# Patient Record
Sex: MALE | Race: WHITE | Employment: FULL TIME | URBAN - METROPOLITAN AREA
[De-identification: names, ages, dates, MRNs, and addresses within clinical notes are randomized per-mention and may not be internally consistent; named-entity substitution may affect disease eponyms.]

---

## 2019-01-04 ENCOUNTER — TRANSFERRED RECORDS (OUTPATIENT)
Dept: HEALTH INFORMATION MANAGEMENT | Facility: CLINIC | Age: 44
End: 2019-01-04

## 2019-01-11 ENCOUNTER — TRANSFERRED RECORDS (OUTPATIENT)
Dept: HEALTH INFORMATION MANAGEMENT | Facility: CLINIC | Age: 44
End: 2019-01-11

## 2019-01-23 ENCOUNTER — TRANSFERRED RECORDS (OUTPATIENT)
Dept: HEALTH INFORMATION MANAGEMENT | Facility: CLINIC | Age: 44
End: 2019-01-23

## 2019-02-08 ENCOUNTER — TRANSFERRED RECORDS (OUTPATIENT)
Dept: HEALTH INFORMATION MANAGEMENT | Facility: CLINIC | Age: 44
End: 2019-02-08

## 2019-02-14 ENCOUNTER — TRANSFERRED RECORDS (OUTPATIENT)
Dept: HEALTH INFORMATION MANAGEMENT | Facility: CLINIC | Age: 44
End: 2019-02-14

## 2019-02-14 LAB — POTASSIUM SERPL-SCNC: 3.9 MMOL/L (ref 3.5–5)

## 2019-02-19 ENCOUNTER — TELEPHONE (OUTPATIENT)
Dept: NEUROSURGERY | Facility: CLINIC | Age: 44
End: 2019-02-19

## 2019-02-19 ENCOUNTER — OFFICE VISIT (OUTPATIENT)
Dept: NEUROSURGERY | Facility: CLINIC | Age: 44
End: 2019-02-19
Attending: NURSE PRACTITIONER
Payer: COMMERCIAL

## 2019-02-19 VITALS
WEIGHT: 205 LBS | SYSTOLIC BLOOD PRESSURE: 127 MMHG | OXYGEN SATURATION: 96 % | DIASTOLIC BLOOD PRESSURE: 89 MMHG | HEART RATE: 119 BPM | HEIGHT: 71 IN | BODY MASS INDEX: 28.7 KG/M2

## 2019-02-19 DIAGNOSIS — M54.9 INTRACTABLE BACK PAIN: ICD-10-CM

## 2019-02-19 DIAGNOSIS — M54.42 ACUTE BILATERAL LOW BACK PAIN WITH LEFT-SIDED SCIATICA: Primary | ICD-10-CM

## 2019-02-19 DIAGNOSIS — R29.898 LEFT LEG WEAKNESS: ICD-10-CM

## 2019-02-19 PROCEDURE — 99203 OFFICE O/P NEW LOW 30 MIN: CPT | Performed by: NEUROLOGICAL SURGERY

## 2019-02-19 PROCEDURE — G0463 HOSPITAL OUTPT CLINIC VISIT: HCPCS

## 2019-02-19 RX ORDER — LOSARTAN POTASSIUM 25 MG/1
50 TABLET ORAL DAILY
COMMUNITY

## 2019-02-19 ASSESSMENT — MIFFLIN-ST. JEOR: SCORE: 1842

## 2019-02-19 ASSESSMENT — PAIN SCALES - GENERAL: PAINLEVEL: SEVERE PAIN (6)

## 2019-02-19 NOTE — PATIENT INSTRUCTIONS
Patient Instructions  Pre-Operative:   -Surgery scheduled at [facility] for Left L5-S1 Microdiscectomy  -Surgical risks: blood clots in the leg or lung, problems urinating, nerve damage, drainage from the incision, infection,stiffness  - Pre-operative physical with primary care physician within 30 days of surgical date.   - Likely same day procedure with discharge home day of surgery, may stay for 23 hour observation hospitalization for monitoring.     -Shower procedure: please shower with antimicrobial soap the night before surgery and morning of surgery. Please refer to showering instruction sheet in folder.  -Stop all solid foods 8 hours before surgery.  -Keep drinking clear liquids until 4 hours before surgery. Clear liquids include water, clear juice, black coffee, or clear tea without milk, Gatorade, clear soda.   - Discontinue Aspirin, NSAIDs (Advil/Ibuprofen, Naproxen,Nuprin,Relafen/Nabumetone, Diclofenac,Meloxicam, Aleve, Celebrex) x 7 days prior to surgical date.    - May try tylenol for pain 1000 mg three times per day for pain      Post-Operative:  -you may resume taking NSAIDs 7 days post op   - Post operative incisional pain x 1-2 weeks which will require pain medications and muscle relaxants. You will receive medication upon discharge.  -Do NOT drive while taking narcotic pain medication.  -Do not drink alcohol while using any pain medication  -Post operative incision care- Watch for signs of infection: redness, swelling, warmth, drainage, and fever of 101 degrees or higher. Notify clinic [phone].  -No submerging incision in water such as pools, hot tubs,baths for at least 8 weeks or until incision is healed. Showers are fine.   - Post operative activity limitations: 6-8 weeks, no lifting > 10 pounds, no bending, twisting, or overhead reaching.  -Ok to walk as tolerated, avoid bed rest and prolonged sitting.  -No contact sports until after follow up visit  -No high impact activities  such as; running/jogging, snowmobile or 4 de la o riding or any other recreational vehicles.  - Post op follow up appointments:6 week post op follow up visit with Nurse practitioner.Please call to schedule follow up appointment at [phone].  -If you are currently employed, you will need to be off work for 2-4 weeks for post op recovery and healing.

## 2019-02-19 NOTE — LETTER
2/19/2019         RE: Ernesto Spivey  [address]  [facility] [address]        Dear Colleague,    Thank you for referring your patient, Enresto Spivey, to the [facility]. Please see a copy of my visit note below.    Neurosurgery Spine Consult [facility]      CC: Intractable left lower extremity pain    Primary care Provider: Deneen Smith    I was aked to see the patient by;  Referred Self, MD  No address on file      [place]: Ernesto Spivey is a 44 year old male that presents to clinic with a complaint of intractable left lower extremity pain and weakness.  The patient describes having a fall on December 25 and subsequent pain developed on December 30.  The pain is in the left lower extremity in the S1 distribution and was worse with conservative measures including therapy and injections.  The patient has very limited ability to stand and walk and actually uses a walker and has to sit in a kyphotic position due to pain.  Patient states he is unable to function without the walker and his activities of daily living has been significantly limited.      Past Medical History:   Diagnosis Date     Hypertension      MS (multiple sclerosis) (H)        No past surgical history on file.    Current Outpatient Medications   Medication     Cholecalciferol (VITAMIN D3 PO)     glatiramer (COPAXONE) 20 MG/ML injection     HYDROCHLOROTHIAZIDE     losartan (COZAAR) 25 MG tablet     No current facility-administered medications for this visit.        Allergies   Allergen Reactions     Lisinopril Other (See Comments)     cough       Social History     Socioeconomic History     Marital status: Single     Spouse name: None     Number of children: None     Years of education: None     Highest education level: None   Social Needs     Financial resource strain: None     Food insecurity - worry: None     Food insecurity - inability: None      Transportation needs - medical: None     Transportation needs - non-medical: None   Occupational History     None   Tobacco Use     Smoking status: Former Smoker     Smokeless tobacco: Never Used   Substance and Sexual Activity     Alcohol use: Yes     Drug use: No     Sexual activity: None   Other Topics Concern     None   Social History Narrative     None       No family history on file.      Review Of Systems  Skin: negative  Eyes: negative  Ears/Nose/Throat: negative  Respiratory: No shortness of breath, dyspnea on exertion, cough, or hemoptysis  Cardiovascular: negative  Gastrointestinal: negative  Genitourinary: negative  Musculoskeletal: as above and back pain  Neurologic: as above  Psychiatric: negative  Hematologic/Lymphatic/Immunologic: negative  Endocrine: negative    B/P: 127/89, T: Data Unavailable, P: 119, R: Data Unavailable    Examination:  Normal affect and mood  No obvious labored respiration  No skin lesions noted   No obvious pitting edema  No abnormal psychiatric behavior  No obvious musculoskeletal abnormalities   Awake  Alert  Oriented x 3  Speech clear  Cranial nerves II - XII intact  Face symmetric    Limited ROM of back  Motor exam limited by severe pain left lower extremity, however, the right lower extremity has normal motor strength  Sensation decreased in left lower extremity  Clonus negative  DTR 1 +  Positive Lase'theodora's sign on the left  Ambulation is limited with walker and kyphotic posture    Imaging:   MRI lumbar spine shows a left L5-S1 herniated disc fragment compressing the left S1 nerve root      Assessment/Plan:   The patient is suffering from intractable left lower extremity pain and weakness secondary to herniated disc on the left at L5-S1 compressing the S1 nerve root.  Therefore I recommend we proceed with an urgent left L5-S1 microdiscectomy.I discussed with the patient the risk of surgery to include, but, not be limited to; nerve injury, failure of improvement  of symptoms, CSF leak, infection, post op hematoma, the need for recurrent surgery, paralysis, coma and death.         Marlon Rodriguez MD, MS, FAANS  Neurosurgeon  [facility]  [address], [address]  [place] [address] [phone]    Again, thank you for allowing me to participate in the care of your patient.        Sincerely,        Marlon Rodriguez MD

## 2019-02-19 NOTE — PROGRESS NOTES
Neurosurgery Spine Consult [facility]      CC: Intractable left lower extremity pain    Primary care Provider: Deneen Smith was aked to see the patient by;  Referred MD Jin  No address on file      [place]: Ernesto Spivey is a 44 year old male that presents to clinic with a complaint of intractable left lower extremity pain and weakness.  The patient describes having a fall on December 25 and subsequent pain developed on December 30.  The pain is in the left lower extremity in the S1 distribution and was worse with conservative measures including therapy and injections.  The patient has very limited ability to stand and walk and actually uses a walker and has to sit in a kyphotic position due to pain.  Patient states he is unable to function without the walker and his activities of daily living has been significantly limited.      Past Medical History:   Diagnosis Date     Hypertension      MS (multiple sclerosis) (H)        No past surgical history on file.    Current Outpatient Medications   Medication     Cholecalciferol (VITAMIN D3 PO)     glatiramer (COPAXONE) 20 MG/ML injection     HYDROCHLOROTHIAZIDE     losartan (COZAAR) 25 MG tablet     No current facility-administered medications for this visit.        Allergies   Allergen Reactions     Lisinopril Other (See Comments)     cough       Social History     Socioeconomic History     Marital status: Single     Spouse name: None     Number of children: None     Years of education: None     Highest education level: None   Social Needs     Financial resource strain: None     Food insecurity - worry: None     Food insecurity - inability: None     Transportation needs - medical: None     Transportation needs - non-medical: None   Occupational History     None   Tobacco Use     Smoking status: Former Smoker     Smokeless tobacco: Never Used   Substance and Sexual Activity     Alcohol use: Yes     Drug use: No     Sexual  activity: None   Other Topics Concern     None   Social History Narrative     None       No family history on file.      Review Of Systems  Skin: negative  Eyes: negative  Ears/Nose/Throat: negative  Respiratory: No shortness of breath, dyspnea on exertion, cough, or hemoptysis  Cardiovascular: negative  Gastrointestinal: negative  Genitourinary: negative  Musculoskeletal: as above and back pain  Neurologic: as above  Psychiatric: negative  Hematologic/Lymphatic/Immunologic: negative  Endocrine: negative    B/P: 127/89, T: Data Unavailable, P: 119, R: Data Unavailable    Examination:  Normal affect and mood  No obvious labored respiration  No skin lesions noted   No obvious pitting edema  No abnormal psychiatric behavior  No obvious musculoskeletal abnormalities   Awake  Alert  Oriented x 3  Speech clear  Cranial nerves II - XII intact  Face symmetric    Limited ROM of back  Motor exam limited by severe pain left lower extremity, however, the right lower extremity has normal motor strength  Sensation decreased in left lower extremity  Clonus negative  DTR 1 +  Positive Lase'theodora's sign on the left  Ambulation is limited with walker and kyphotic posture    Imaging:   MRI lumbar spine shows a left L5-S1 herniated disc fragment compressing the left S1 nerve root      Assessment/Plan:   The patient is suffering from intractable left lower extremity pain and weakness secondary to herniated disc on the left at L5-S1 compressing the S1 nerve root.  Therefore I recommend we proceed with an urgent left L5-S1 microdiscectomy.I discussed with the patient the risk of surgery to include, but, not be limited to; nerve injury, failure of improvement of symptoms, CSF leak, infection, post op hematoma, the need for recurrent surgery, paralysis, coma and death.         Marlon Rodriguez MD, MS, FAANS  Neurosurgeon  [facility]  [facility]  [id number] [place] [address]  [address]  [place] [address]  [phone]

## 2019-02-20 RX ORDER — GABAPENTIN 300 MG/1
300 CAPSULE ORAL 3 TIMES DAILY
Status: ON HOLD | COMMUNITY
End: 2019-05-21

## 2019-02-20 RX ORDER — OXYCODONE AND ACETAMINOPHEN 5; 325 MG/1; MG/1
1 TABLET ORAL EVERY 6 HOURS PRN
Status: ON HOLD | COMMUNITY
End: 2019-02-21

## 2019-02-20 RX ORDER — ATORVASTATIN CALCIUM 40 MG/1
40 TABLET, FILM COATED ORAL DAILY
COMMUNITY

## 2019-02-20 RX ORDER — ACETAMINOPHEN 325 MG/1
325-650 TABLET ORAL EVERY 4 HOURS PRN
COMMUNITY

## 2019-02-21 ENCOUNTER — ANESTHESIA EVENT (OUTPATIENT)
Dept: SURGERY | Facility: CLINIC | Age: 44
End: 2019-02-21
Payer: COMMERCIAL

## 2019-02-21 ENCOUNTER — APPOINTMENT (OUTPATIENT)
Dept: GENERAL RADIOLOGY | Facility: CLINIC | Age: 44
End: 2019-02-21
Attending: NEUROLOGICAL SURGERY
Payer: COMMERCIAL

## 2019-02-21 ENCOUNTER — ANESTHESIA (OUTPATIENT)
Dept: SURGERY | Facility: CLINIC | Age: 44
End: 2019-02-21
Payer: COMMERCIAL

## 2019-02-21 ENCOUNTER — HOSPITAL ENCOUNTER (OUTPATIENT)
Facility: CLINIC | Age: 44
Discharge: HOME | End: 2019-02-21
Attending: NEUROLOGICAL SURGERY | Admitting: NEUROLOGICAL SURGERY
Payer: COMMERCIAL

## 2019-02-21 VITALS
BODY MASS INDEX: 29.35 KG/M2 | HEART RATE: 75 BPM | TEMPERATURE: 98.7 F | HEIGHT: 70 IN | OXYGEN SATURATION: 95 % | SYSTOLIC BLOOD PRESSURE: 133 MMHG | DIASTOLIC BLOOD PRESSURE: 88 MMHG | WEIGHT: 205 LBS | RESPIRATION RATE: 14 BRPM

## 2019-02-21 DIAGNOSIS — Z98.890 S/P LUMBAR MICRODISCECTOMY: Primary | ICD-10-CM

## 2019-02-21 LAB
CREAT SERPL-MCNC: 0.68 MG/DL (ref 0.66–1.25)
GFR SERPL CREATININE-BSD FRML MDRD: >90 ML/MIN/{1.73_M2}

## 2019-02-21 PROCEDURE — 40000985 XR LUMBAR SPINE PORT 1 VW: Mod: TC

## 2019-02-21 PROCEDURE — 25800025 ZZH RX 258: Performed by: NEUROLOGICAL SURGERY

## 2019-02-21 PROCEDURE — 36000071 ZZH SURGERY LEVEL 5 W FLUORO 1ST 30 MIN: Performed by: NEUROLOGICAL SURGERY

## 2019-02-21 PROCEDURE — 71000012 ZZH RECOVERY PHASE 1 LEVEL 1 FIRST HR: Performed by: NEUROLOGICAL SURGERY

## 2019-02-21 PROCEDURE — 37000009 ZZH ANESTHESIA TECHNICAL FEE, EACH ADDTL 15 MIN: Performed by: NEUROLOGICAL SURGERY

## 2019-02-21 PROCEDURE — 93010 ELECTROCARDIOGRAM REPORT: CPT | Performed by: INTERNAL MEDICINE

## 2019-02-21 PROCEDURE — 25000128 H RX IP 250 OP 636: Performed by: NEUROLOGICAL SURGERY

## 2019-02-21 PROCEDURE — 40000306 ZZH STATISTIC PRE PROC ASSESS II: Performed by: NEUROLOGICAL SURGERY

## 2019-02-21 PROCEDURE — 25000125 ZZHC RX 250: Performed by: NURSE ANESTHETIST, CERTIFIED REGISTERED

## 2019-02-21 PROCEDURE — 25000132 ZZH RX MED GY IP 250 OP 250 PS 637: Performed by: NEUROLOGICAL SURGERY

## 2019-02-21 PROCEDURE — 63030 LAMOT DCMPRN NRV RT 1 LMBR: CPT | Mod: LT | Performed by: NEUROLOGICAL SURGERY

## 2019-02-21 PROCEDURE — 36000069 ZZH SURGERY LEVEL 5 EA 15 ADDTL MIN: Performed by: NEUROLOGICAL SURGERY

## 2019-02-21 PROCEDURE — 36415 COLL VENOUS BLD VENIPUNCTURE: CPT | Performed by: ANESTHESIOLOGY

## 2019-02-21 PROCEDURE — 25800030 ZZH RX IP 258 OP 636: Performed by: NURSE ANESTHETIST, CERTIFIED REGISTERED

## 2019-02-21 PROCEDURE — 37000008 ZZH ANESTHESIA TECHNICAL FEE, 1ST 30 MIN: Performed by: NEUROLOGICAL SURGERY

## 2019-02-21 PROCEDURE — 25000125 ZZHC RX 250: Performed by: NEUROLOGICAL SURGERY

## 2019-02-21 PROCEDURE — 25800030 ZZH RX IP 258 OP 636: Performed by: ANESTHESIOLOGY

## 2019-02-21 PROCEDURE — 82565 ASSAY OF CREATININE: CPT | Performed by: ANESTHESIOLOGY

## 2019-02-21 PROCEDURE — 27210995 ZZH RX 272: Performed by: NEUROLOGICAL SURGERY

## 2019-02-21 PROCEDURE — 25000128 H RX IP 250 OP 636: Performed by: NURSE ANESTHETIST, CERTIFIED REGISTERED

## 2019-02-21 PROCEDURE — 27210794 ZZH OR GENERAL SUPPLY STERILE: Performed by: NEUROLOGICAL SURGERY

## 2019-02-21 PROCEDURE — 71000027 ZZH RECOVERY PHASE 2 EACH 15 MINS: Performed by: NEUROLOGICAL SURGERY

## 2019-02-21 PROCEDURE — 25000300 ZZH OR RX SURGIFLO HEMOSTATIC MATRIX 10ML 199102S OPNP: Performed by: NEUROLOGICAL SURGERY

## 2019-02-21 PROCEDURE — 25000128 H RX IP 250 OP 636: Performed by: ANESTHESIOLOGY

## 2019-02-21 RX ORDER — HYDRALAZINE HYDROCHLORIDE 20 MG/ML
2.5-5 INJECTION INTRAMUSCULAR; INTRAVENOUS EVERY 10 MIN PRN
Status: DISCONTINUED | OUTPATIENT
Start: 2019-02-21 | End: 2019-02-21 | Stop reason: HOSPADM

## 2019-02-21 RX ORDER — ONDANSETRON 2 MG/ML
INJECTION INTRAMUSCULAR; INTRAVENOUS PRN
Status: DISCONTINUED | OUTPATIENT
Start: 2019-02-21 | End: 2019-02-21

## 2019-02-21 RX ORDER — FENTANYL CITRATE 50 UG/ML
25-50 INJECTION, SOLUTION INTRAMUSCULAR; INTRAVENOUS
Status: DISCONTINUED | OUTPATIENT
Start: 2019-02-21 | End: 2019-02-21 | Stop reason: HOSPADM

## 2019-02-21 RX ORDER — BUPIVACAINE HYDROCHLORIDE AND EPINEPHRINE 5; 5 MG/ML; UG/ML
INJECTION, SOLUTION PERINEURAL PRN
Status: DISCONTINUED | OUTPATIENT
Start: 2019-02-21 | End: 2019-02-21 | Stop reason: HOSPADM

## 2019-02-21 RX ORDER — SODIUM CHLORIDE, SODIUM LACTATE, POTASSIUM CHLORIDE, CALCIUM CHLORIDE 600; 310; 30; 20 MG/100ML; MG/100ML; MG/100ML; MG/100ML
INJECTION, SOLUTION INTRAVENOUS CONTINUOUS
Status: DISCONTINUED | OUTPATIENT
Start: 2019-02-21 | End: 2019-02-21 | Stop reason: HOSPADM

## 2019-02-21 RX ORDER — CEFAZOLIN SODIUM 1 G/3ML
1 INJECTION, POWDER, FOR SOLUTION INTRAMUSCULAR; INTRAVENOUS SEE ADMIN INSTRUCTIONS
Status: DISCONTINUED | OUTPATIENT
Start: 2019-02-21 | End: 2019-02-21 | Stop reason: HOSPADM

## 2019-02-21 RX ORDER — ALBUTEROL SULFATE 0.83 MG/ML
2.5 SOLUTION RESPIRATORY (INHALATION) EVERY 4 HOURS PRN
Status: DISCONTINUED | OUTPATIENT
Start: 2019-02-21 | End: 2019-02-21 | Stop reason: HOSPADM

## 2019-02-21 RX ORDER — CEFAZOLIN SODIUM 2 G/100ML
2 INJECTION, SOLUTION INTRAVENOUS
Status: COMPLETED | OUTPATIENT
Start: 2019-02-21 | End: 2019-02-21

## 2019-02-21 RX ORDER — FENTANYL CITRATE 50 UG/ML
INJECTION, SOLUTION INTRAMUSCULAR; INTRAVENOUS PRN
Status: DISCONTINUED | OUTPATIENT
Start: 2019-02-21 | End: 2019-02-21

## 2019-02-21 RX ORDER — ONDANSETRON 4 MG/1
4 TABLET, ORALLY DISINTEGRATING ORAL EVERY 30 MIN PRN
Status: DISCONTINUED | OUTPATIENT
Start: 2019-02-21 | End: 2019-02-21 | Stop reason: HOSPADM

## 2019-02-21 RX ORDER — DIAZEPAM 5 MG
5 TABLET ORAL EVERY 8 HOURS PRN
Qty: 20 TABLET | Refills: 0 | Status: ON HOLD | OUTPATIENT
Start: 2019-02-21 | End: 2019-05-21

## 2019-02-21 RX ORDER — LIDOCAINE HYDROCHLORIDE 10 MG/ML
INJECTION, SOLUTION INFILTRATION; PERINEURAL PRN
Status: DISCONTINUED | OUTPATIENT
Start: 2019-02-21 | End: 2019-02-21

## 2019-02-21 RX ORDER — ONDANSETRON 2 MG/ML
4 INJECTION INTRAMUSCULAR; INTRAVENOUS EVERY 30 MIN PRN
Status: DISCONTINUED | OUTPATIENT
Start: 2019-02-21 | End: 2019-02-21 | Stop reason: HOSPADM

## 2019-02-21 RX ORDER — OXYCODONE AND ACETAMINOPHEN 5; 325 MG/1; MG/1
1-2 TABLET ORAL EVERY 6 HOURS PRN
Qty: 40 TABLET | Refills: 0 | Status: ON HOLD | OUTPATIENT
Start: 2019-02-21 | End: 2019-05-21

## 2019-02-21 RX ORDER — LIDOCAINE 40 MG/G
CREAM TOPICAL
Status: DISCONTINUED | OUTPATIENT
Start: 2019-02-21 | End: 2019-02-21 | Stop reason: HOSPADM

## 2019-02-21 RX ORDER — NALOXONE HYDROCHLORIDE 0.4 MG/ML
.1-.4 INJECTION, SOLUTION INTRAMUSCULAR; INTRAVENOUS; SUBCUTANEOUS
Status: DISCONTINUED | OUTPATIENT
Start: 2019-02-21 | End: 2019-02-21 | Stop reason: HOSPADM

## 2019-02-21 RX ORDER — NEOSTIGMINE METHYLSULFATE 1 MG/ML
VIAL (ML) INJECTION PRN
Status: DISCONTINUED | OUTPATIENT
Start: 2019-02-21 | End: 2019-02-21

## 2019-02-21 RX ORDER — DEXAMETHASONE SODIUM PHOSPHATE 4 MG/ML
INJECTION, SOLUTION INTRA-ARTICULAR; INTRALESIONAL; INTRAMUSCULAR; INTRAVENOUS; SOFT TISSUE PRN
Status: DISCONTINUED | OUTPATIENT
Start: 2019-02-21 | End: 2019-02-21

## 2019-02-21 RX ORDER — GLYCOPYRROLATE 0.2 MG/ML
INJECTION, SOLUTION INTRAMUSCULAR; INTRAVENOUS PRN
Status: DISCONTINUED | OUTPATIENT
Start: 2019-02-21 | End: 2019-02-21

## 2019-02-21 RX ORDER — OXYCODONE AND ACETAMINOPHEN 5; 325 MG/1; MG/1
1-2 TABLET ORAL EVERY 6 HOURS PRN
Status: DISCONTINUED | OUTPATIENT
Start: 2019-02-21 | End: 2019-02-21 | Stop reason: HOSPADM

## 2019-02-21 RX ORDER — PROPOFOL 10 MG/ML
INJECTION, EMULSION INTRAVENOUS PRN
Status: DISCONTINUED | OUTPATIENT
Start: 2019-02-21 | End: 2019-02-21

## 2019-02-21 RX ADMIN — SODIUM CHLORIDE, POTASSIUM CHLORIDE, SODIUM LACTATE AND CALCIUM CHLORIDE: 600; 310; 30; 20 INJECTION, SOLUTION INTRAVENOUS at 09:00

## 2019-02-21 RX ADMIN — GLYCOPYRROLATE 0.2 MG: 0.2 INJECTION, SOLUTION INTRAMUSCULAR; INTRAVENOUS at 10:22

## 2019-02-21 RX ADMIN — PROPOFOL 200 MG: 10 INJECTION, EMULSION INTRAVENOUS at 10:22

## 2019-02-21 RX ADMIN — GLYCOPYRROLATE 0.8 MG: 0.2 INJECTION, SOLUTION INTRAMUSCULAR; INTRAVENOUS at 11:43

## 2019-02-21 RX ADMIN — ROCURONIUM BROMIDE 50 MG: 10 INJECTION INTRAVENOUS at 10:23

## 2019-02-21 RX ADMIN — PHENYLEPHRINE HYDROCHLORIDE 50 MCG: 10 INJECTION, SOLUTION INTRAMUSCULAR; INTRAVENOUS; SUBCUTANEOUS at 11:24

## 2019-02-21 RX ADMIN — DEXAMETHASONE SODIUM PHOSPHATE 4 MG: 4 INJECTION, SOLUTION INTRA-ARTICULAR; INTRALESIONAL; INTRAMUSCULAR; INTRAVENOUS; SOFT TISSUE at 10:23

## 2019-02-21 RX ADMIN — LIDOCAINE HYDROCHLORIDE 50 MG: 10 INJECTION, SOLUTION INFILTRATION; PERINEURAL at 10:22

## 2019-02-21 RX ADMIN — ONDANSETRON 4 MG: 2 INJECTION INTRAMUSCULAR; INTRAVENOUS at 11:46

## 2019-02-21 RX ADMIN — ROCURONIUM BROMIDE 5 MG: 10 INJECTION INTRAVENOUS at 10:53

## 2019-02-21 RX ADMIN — HYDROMORPHONE HYDROCHLORIDE 0.5 MG: 1 INJECTION, SOLUTION INTRAMUSCULAR; INTRAVENOUS; SUBCUTANEOUS at 10:39

## 2019-02-21 RX ADMIN — FENTANYL CITRATE 100 MCG: 50 INJECTION, SOLUTION INTRAMUSCULAR; INTRAVENOUS at 10:22

## 2019-02-21 RX ADMIN — HYDROMORPHONE HYDROCHLORIDE 0.5 MG: 1 INJECTION, SOLUTION INTRAMUSCULAR; INTRAVENOUS; SUBCUTANEOUS at 10:36

## 2019-02-21 RX ADMIN — Medication 5 MG: at 11:43

## 2019-02-21 RX ADMIN — FENTANYL CITRATE 100 MCG: 50 INJECTION, SOLUTION INTRAMUSCULAR; INTRAVENOUS at 10:53

## 2019-02-21 RX ADMIN — HYDRALAZINE HYDROCHLORIDE 5 MG: 20 INJECTION INTRAMUSCULAR; INTRAVENOUS at 12:59

## 2019-02-21 RX ADMIN — SODIUM CHLORIDE, POTASSIUM CHLORIDE, SODIUM LACTATE AND CALCIUM CHLORIDE: 600; 310; 30; 20 INJECTION, SOLUTION INTRAVENOUS at 11:25

## 2019-02-21 RX ADMIN — MIDAZOLAM 2 MG: 1 INJECTION INTRAMUSCULAR; INTRAVENOUS at 10:17

## 2019-02-21 RX ADMIN — CEFAZOLIN SODIUM 2 G: 2 INJECTION, SOLUTION INTRAVENOUS at 10:33

## 2019-02-21 RX ADMIN — PHENYLEPHRINE HYDROCHLORIDE 50 MCG: 10 INJECTION, SOLUTION INTRAMUSCULAR; INTRAVENOUS; SUBCUTANEOUS at 11:41

## 2019-02-21 RX ADMIN — OXYCODONE HYDROCHLORIDE AND ACETAMINOPHEN 1 TABLET: 5; 325 TABLET ORAL at 12:40

## 2019-02-21 RX ADMIN — PHENYLEPHRINE HYDROCHLORIDE 50 MCG: 10 INJECTION, SOLUTION INTRAMUSCULAR; INTRAVENOUS; SUBCUTANEOUS at 10:41

## 2019-02-21 RX ADMIN — PHENYLEPHRINE HYDROCHLORIDE 50 MCG: 10 INJECTION, SOLUTION INTRAMUSCULAR; INTRAVENOUS; SUBCUTANEOUS at 11:20

## 2019-02-21 RX ADMIN — HYDRALAZINE HYDROCHLORIDE 5 MG: 20 INJECTION INTRAMUSCULAR; INTRAVENOUS at 13:25

## 2019-02-21 ASSESSMENT — MIFFLIN-ST. JEOR: SCORE: 1826.12

## 2019-02-21 NOTE — ANESTHESIA POSTPROCEDURE EVALUATION
Patient: Ernesto Spivey    Procedure(s):  Left L5-S1 microdiscectomy    Diagnosis:Left L5-S1 herniated disc with LLE inretractable pain  Diagnosis Additional Information: Left L5-S1 herniated disk with left lower extremity radiculopathy and weakness    Anesthesia Type:  General, ETT    Note:  Anesthesia Post Evaluation    Patient location during evaluation: PACU  Patient participation: Able to fully participate in evaluation  Level of consciousness: awake and alert  Pain management: adequate  Airway patency: patent  Cardiovascular status: acceptable  Respiratory status: acceptable  Hydration status: acceptable  PONV: none     Anesthetic complications: None          Last vitals:  Vitals:    02/21/19 1300 02/21/19 1305 02/21/19 1310   BP: (!) 138/101 (!) 138/99 (!) 138/95   Pulse: 76 77    Resp: 17 12 12   Temp:      SpO2: 97% 99% 96%         Electronically Signed By: Gordy Lyles MD  February 21, 2019  1:17 PM

## 2019-02-21 NOTE — NURSING NOTE
Pre-operative Education    Education included but not limited to:  - Pre-operative physical with primary care physician within 30 days of surgical date. [facility] was completed 2/14/19.  - Pre-operative clearance (cardiology, hematology, etc).   - Discontinue Aspirin, NSAIDs, Diclofenac x 7 days prior to surgical date.     -May try tylenol for pain 1000 mg three times per day for pain    -you may resume taking NSAIDs 7 days post op   -Patient is not a smoker  -Forms to be completed: FMLA/STD 4 weeks   -Surgical risks: blood clots in the leg or lung, problems urinating, nerve damage, drainage from the incision, infection,stiffness  -Preparation timeline  - When to start NPO           -Special bathing procedure.Patient received Hibiclens and showering instruction sheet.   Hospital stay:  Checking in  The surgery itself   Recovery room  - Managing pain   - Likely same day procedure with discharge home day of surgery, may stay for 23 hour observation hospitalization for monitoring  - Post operative incisional pain x 1-2 weeks which will require pain medications and muscle relaxants.   -Do NOT drive or drink alcohol while taking narcotic pain medication.  -Post operative incision care-Keep your incision clean and dry at all times. OK to remove dressing on postop day 2. OK to shower on postop day 3 and allow water to run over incision, pat dry after shower. No bathing, swimming or submerging in water. Call immediately or come to ED if any drainage occurs, or you develop new pain. Watch for signs of infection: redness, swelling, warmth, drainage, and fever of 101 degrees or higher. Notify clinic.   - Post operative activity limitations: 6-8 weeks, no lifting > 10 pounds, no bending, twisting, or overhead reaching.  -Ok to walk as tolerated, avoid bed rest and prolonged sitting.  -No contact sports until after follow up visit  -No high impact activities such as; running/jogging, snowmobile or 4  de la o riding or any other recreational vehicles.    - Post op follow up appointments: 6 weeks with Nurse Practitioner. Please call to schedule follow up appointment at [phone].   - Spine Education book was also given to the patient for further review.      Patient verbalized understanding of above instructions. All questions were answered to the best of my ability and the patient's satisfaction. Patient advised to call with any additional questions or concerns.  Kristen Lyman RN

## 2019-02-21 NOTE — ANESTHESIA CARE TRANSFER NOTE
Patient: Ernesto Spivey    Procedure(s):  Left L5-S1 microdiscectomy    Diagnosis: Left L5-S1 herniated disc with LLE inretractable pain  Diagnosis Additional Information: No value filed.    Anesthesia Type:   General, ETT     Note:  Airway :Face Mask  Patient transferred to:PACU  Handoff Report: Identifed the Patient, Identified the Reponsible Provider, Reviewed the pertinent medical history, Discussed the surgical course, Reviewed Intra-OP anesthesia mangement and issues during anesthesia, Set expectations for post-procedure period and Allowed opportunity for questions and acknowledgement of understanding      Vitals: (Last set prior to Anesthesia Care Transfer)    CRNA VITALS  2/21/2019 1123 - 2/21/2019 1203      2/21/2019             Pulse:  108    SpO2:  99 %    Resp Rate (observed):  21    EKG:  NSR                Electronically Signed By: KLAUDIA Pace CRNA  February 21, 2019  12:03 PM

## 2019-02-21 NOTE — OP NOTE
Operative Report    PREOPERATIVE DIAGNOSIS: Left L5-S1 herniated disk with left lower extremity radiculopathy and weakness     POSTOPERATIVE DIAGNOSIS: Same    PROCEDURE: Left L5-S1 hemilaminectomy, medial facetectomy, foraminotomy with microdiscectomy and use of X-ray and intraoperative microscope     ASSISTANT: Jakub Jason     INDICATION FOR PROCEDURE: The patient is a 44 year old male that presented with LLE radiculopathy and weakness. After reviewing the imaging studies and examination, the decision was made to proceed with the above procedure. The patient understood the risk of surgery to be infection, CSF leak, nerve root injury, failure of improvement of his symptoms. The patient voiced understanding and wanted to proceed.     DESCRIPTION OF PROCEDURE: The patient was seen in the pre op area and the procedure was discussed with the patient and family once again and all questions were answered. The consent was then signed and the lumbar spine was marked with a marker. The patient was transported to the operating room on a stretcher and received general endotracheal anesthesia. The patient was placed on the operating table in the prone position on the Aries frame with all pressure points padded. The planned operative area of the back was prepped and draped in normal sterile fashion. Portable X-ray was used to identify the appropriate level. Local anesthesia was then injected along the planned incision. A 10 blade scalpel was used to make a midline incision with dissection down through the subcutaneous tissue to the fascia. The fascia was opened on the left side with the Bovie cautery. Subperiosteal dissection exposed the lamina facet joint at L5-S1. The Mosley retractor was then placed. The Midas Jourdan drill and the Kerrison rongeur were used to perform the hemilaminectomy, medial facetectomy, foraminotomy, and to remove the ligamentum flavum. The thecal sac and S1 nerve root were identified. There was a  free fragment of disk compressing the left S1 root and a very friable disk in the L5-S1 interspace. The disk was then removed with the Sandoval curette and also the pituitary rongeur. The nerve root was then noted to be decompressed and the Lin ball hook was used to verify that. Copious irrigation was performed with saline. The retractor was removed and the wound was irrigated once again. The fascia was then closed with 0-Vicryl, the subcutaneous tissue with 3-0 Vicryl, and the skin closed with dermabond. [facility], and sent to recovery. At the end of the case, all counts were correct.     No complications.     ESTIMATED BLOOD LOSS: 5 ml     IV FLUID: See Anesthesia report     The patient received Ancef preoperatively.     Marlon Rodriguez MD, MS, FAANS

## 2019-02-21 NOTE — ANESTHESIA PREPROCEDURE EVALUATION
Anesthesia Pre-Procedure Evaluation    Patient: Ernesto Spivey   MRN: [id number] : 1975          Preoperative Diagnosis: Left L5-S1 herniated disc with LLE inretractable pain    Procedure(s):  Left L5-S1 microdiscectomy    Past Medical History:   Diagnosis Date     Hypertension      MS (multiple sclerosis) (H)      Multiple sclerosis (H)      Sleep apnea     mild sleep apnea while lying on back per sleep study, no CPAP     Past Surgical History:   Procedure Laterality Date     COLONOSCOPY       ENT SURGERY      growth in inner ear removed as a child     ORTHOPEDIC SURGERY Right 2013    knee cyst removed     Anesthesia Evaluation     . Pt has had prior anesthetic. Type: General    No history of anesthetic complications          ROS/MED HX    ENT/Pulmonary:     (+)sleep apnea, , . .    Neurologic:     (+)Multiple Sclerosis     Cardiovascular:     (+) hypertension----. : . . . :. .       METS/Exercise Tolerance:     Hematologic:  - neg hematologic  ROS       Musculoskeletal:   (+) , , other musculoskeletal- spinal issues      GI/Hepatic:  - neg GI/hepatic ROS       Renal/Genitourinary:  - ROS Renal section negative       Endo:  - neg endo ROS       Psychiatric:  - neg psychiatric ROS       Infectious Disease:  - neg infectious disease ROS       Malignancy:      - no malignancy   Other:    - neg other ROS                      Physical Exam  Normal systems: cardiovascular, pulmonary and dental    Airway   Mallampati: I  TM distance: >3 FB  Neck ROM: full    Dental     Cardiovascular       Pulmonary             Lab Results   Component Value Date    WBC 7.0 2013    HGB 15.6 2013    HCT 43.1 2013     2013     2013    POTASSIUM 3.6 2013    CHLORIDE 99 2013    CO2 27 2013    BUN 9 2013    CR 0.68 2019     (H) 2013    OWEN 9.5 2013    MAG 1.7 2010    ALBUMIN 4.7 2013    PROTTOTAL 7.9 2013    ALT 49  "09/17/2013    AST 37 09/17/2013    ALKPHOS 62 09/17/2013    BILITOTAL 0.5 09/17/2013    LIPASE 151 09/17/2013       Preop Vitals  BP Readings from Last 3 Encounters:   02/21/19 (!) 141/98   02/19/19 127/89   09/17/13 123/86    Pulse Readings from Last 3 Encounters:   02/21/19 81   02/19/19 119   09/17/13 84      Resp Readings from Last 3 Encounters:   02/21/19 20   09/17/13 16    SpO2 Readings from Last 3 Encounters:   02/21/19 97%   02/19/19 96%   09/17/13 97%      Temp Readings from Last 1 Encounters:   02/21/19 97.7  F (36.5  C) (Temporal)    Ht Readings from Last 1 Encounters:   02/21/19 1.778 m (5' 10\")      Wt Readings from Last 1 Encounters:   02/21/19 93 kg (205 lb)    Estimated body mass index is 29.41 kg/m  as calculated from the following:    Height as of this encounter: 1.778 m (5' 10\").    Weight as of this encounter: 93 kg (205 lb).       Anesthesia Plan      History & Physical Review  History and physical reviewed and following examination; no interval change.    ASA Status:  3 .    NPO Status:  > 8 hours    Plan for General and ETT with Intravenous and Propofol induction. Maintenance will be Balanced.    PONV prophylaxis:  Ondansetron (or other 5HT-3) and Dexamethasone or Solumedrol       Postoperative Care  Postoperative pain management:  IV analgesics.      Consents  Anesthetic plan, risks, benefits and alternatives discussed with:  Patient or representative and Patient..                 Gordy Lyles MD                    .  "

## 2019-02-21 NOTE — DISCHARGE INSTRUCTIONS
SPINE SURGERY DISCHARGE INSTRUCTIONS  DR. SOURAV PRESCOTT M.D.  [phone]    1.  NO LIFTING OF MORE THAT 10 POUNDS AND NO BENDING, TWISTING OR OVERHEAD REACHING UNTIL FOLLOW-UP VISIT IN 6-8 WEEKS.    2.  OK TO REMOVE DRESSING IN 24 HOURS.  YOU MAY SHOWER IN 48 HOURS,, BUT DO NOT SCRUB OR SUBMERGE INCISION UNDER WATER FOR 6 WEEKS.  IF YOU HAVE DRAINAGE FROM THE INCISION, YOU MAY REPLACE THE DRESSING AS NEEDED.    3.  OK TO USE ICE PACKS TO THE BACK AREA FOR 20 MINUTES ON AND 20 MINUTES OFF FOR 24-48 HOURS AFTER SURGERY.  AVOID ICE CONTACT DIRECTLY TO THE SKIN OR INCISION.    4.  OK TO WALK AS MUCH AS TOLERATED.    5.  NO CONTACT SPORTS UNTIL FOLLOW-UP VISIT.    6.  NO HIGH IMPACT ACTIVITIES SUCH AS RUNNING/JOGGING, SNOWMOBILE OR FOUR WORTHY RIDING OR ANY OTHER RECREACTIONAL VEHICLES.    CALL MY OFFICE [phone] FOR INCREASING REDNESS, SWELLING OR PUS DRAINING FROM THE INCISION.  CALL IF YOU HAVE INCREASED PAIN OR ANY OTHER QUESTIONS OR CONCERNS.    GENERAL ANESTHESIA OR SEDATION ADULT DISCHARGE INSTRUCTIONS   SPECIAL PRECAUTIONS FOR 24 HOURS AFTER SURGERY    IT IS NOT UNUSUAL TO FEEL LIGHT-HEADED OR FAINT, UP TO 24 HOURS AFTER SURGERY OR WHILE TAKING PAIN MEDICATION.  IF YOU HAVE THESE SYMPTOMS; SIT FOR A FEW MINUTES BEFORE STANDING AND HAVE SOMEONE ASSIST YOU WHEN YOU GET UP TO WALK OR USE THE BATHROOM.    YOU SHOULD REST AND RELAX FOR THE NEXT 24 HOURS AND YOU MUST MAKE ARRANGEMENTS TO HAVE SOMEONE STAY WITH YOU FOR AT LEAST 24 HOURS AFTER YOUR DISCHARGE.  AVOID HAZARDOUS AND STRENUOUS ACTIVITIES.  DO NOT MAKE IMPORTANT DECISIONS FOR 24 HOURS.    DO NOT DRIVE ANY VEHICLE OR OPERATE MECHANICAL EQUIPMENT FOR 24 HOURS FOLLOWING THE END OF YOUR SURGERY.  EVEN THOUGH YOU MAY FEEL NORMAL, YOUR REACTIONS MAY BE AFFECTED BY THE MEDICATION YOU HAVE RECEIVED.    DO NOT DRINK ALCOHOLIC BEVERAGES FOR 24 HOURS FOLLOWING YOUR SURGERY.    DRINK CLEAR LIQUIDS (APPLE JUICE, GINGER ALE, 7-UP, BROTH, ETC.).  PROGRESS  TO YOUR REGULAR DIET AS YOU FEEL ABLE.    YOU MAY HAVE A DRY MOUTH, A SORE THROAT, MUSCLES ACHES OR TROUBLE SLEEPING.  THESE SHOULD GO AWAY AFTER 24 HOURS.    CALL YOUR DOCTOR FOR ANY OF THE FOLLOWING:  SIGNS OF INFECTION (FEVER, GROWING TENDERNESS AT THE SURGERY SITE, A LARGE AMOUNT OF DRAINAGE OR BLEEDING, SEVERE PAIN, FOUL-SMELLING DRAINAGE, REDNESS OR SWELLING.    IT HAS BEEN OVER 8 TO 10 HOURS SINCE SURGERY AND YOU ARE STILL NOT ABLE TO URINATE (PASS WATER).

## 2019-02-21 NOTE — TELEPHONE ENCOUNTER
Type of surgery: Left L5-S1 Microdiscectomy   Location of surgery: [facility]  Date and time of surgery: 02/21/2019 at 9:45am  Surgeon: Dr. Rodriguez   Pre-Op Appt Date: 02/14/2019 with PCP  Post-Op Appt Date: 04/04/2019    Packet sent out: Yes  Pre-cert/Authorization completed:  Yes  Date: 02/20/2019

## 2019-02-22 LAB — INTERPRETATION ECG - MUSE: NORMAL

## 2019-02-25 ENCOUNTER — DOCUMENTATION ONLY (OUTPATIENT)
Dept: NEUROSURGERY | Facility: CLINIC | Age: 44
End: 2019-02-25

## 2019-02-25 ENCOUNTER — TELEPHONE (OUTPATIENT)
Dept: NEUROSURGERY | Facility: CLINIC | Age: 44
End: 2019-02-25

## 2019-02-25 NOTE — TELEPHONE ENCOUNTER
Called and spoke with patient. Patient is S/P Left L5-S1 hemilaminectomy, medial facetectomy, foraminotomy with microdiscectomy on 2/21/19. After surgery patient is doing well but reports incisional pain, some left leg weakness in lower leg, denies any falls. Leg pain resolved. Advised to CTM the leg weakness and to contact clinic if worsens. Patient has been eating and drinking well. Patient has been urinating with no issues or concerns and advised patient to increase fluid, fiber intake,OTC stool softener . No issues or concerns with incision. Reminded patient to watch for s/sx of infection, reviewed incision care and post op restrictions. Pain rating 3/10. Pain is controlled by percocet and valium. Reminded patient to schedule 6 week post op visit. All questions were answered to the best of my ability and the patient's satisfaction. Patient advised to call with any additional questions or concerns.

## 2019-02-25 NOTE — PROGRESS NOTES
February 25, 2019    [employer] Forms: yes    Faxed to: [phone]    Type of form: FMLA: Certification of health care provider for employee's serious health condition   [facility]     Placed a copy in the bin and sent the original to medical records

## 2019-03-25 ENCOUNTER — MYC MEDICAL ADVICE (OUTPATIENT)
Dept: NEUROSURGERY | Facility: CLINIC | Age: 44
End: 2019-03-25

## 2019-04-04 ENCOUNTER — OFFICE VISIT (OUTPATIENT)
Dept: NEUROSURGERY | Facility: CLINIC | Age: 44
End: 2019-04-04
Payer: COMMERCIAL

## 2019-04-04 VITALS
DIASTOLIC BLOOD PRESSURE: 90 MMHG | TEMPERATURE: 98.3 F | BODY MASS INDEX: 30.35 KG/M2 | HEART RATE: 71 BPM | OXYGEN SATURATION: 96 % | SYSTOLIC BLOOD PRESSURE: 130 MMHG | HEIGHT: 70 IN | WEIGHT: 212 LBS

## 2019-04-04 DIAGNOSIS — Z98.890 S/P LUMBAR MICRODISCECTOMY: Primary | ICD-10-CM

## 2019-04-04 PROCEDURE — 99024 POSTOP FOLLOW-UP VISIT: CPT | Performed by: NURSE PRACTITIONER

## 2019-04-04 ASSESSMENT — MIFFLIN-ST. JEOR: SCORE: 1857.88

## 2019-04-04 ASSESSMENT — PAIN SCALES - GENERAL: PAINLEVEL: NO PAIN (0)

## 2019-04-04 NOTE — NURSING NOTE
"Ernesto Spivey is a 44 year old male who presents for:  Chief Complaint   Patient presents with     Surgical Followup     6 week follow up s/p left L5-S1 microdiscectomy.         Initial Vitals:  /90 (BP Location: Right arm, Patient Position: Sitting, Cuff Size: Adult Large)   Pulse 71   Temp 98.3  F (36.8  C) (Oral)   Ht 5' 10\" (1.778 m)   Wt 212 lb (96.2 kg)   SpO2 96%   BMI 30.42 kg/m   Estimated body mass index is 30.42 kg/m  as calculated from the following:    Height as of this encounter: 5' 10\" (1.778 m).    Weight as of this encounter: 212 lb (96.2 kg).. Body surface area is 2.18 meters squared. BP completed using cuff size: large  No Pain (0)        Nursing Comments: Pain level today is a 0        Mahogany Taylor  \  "

## 2019-04-04 NOTE — LETTER
"    4/4/2019         RE: Ernesto Spivey  [address]  [place] [address]        Dear Colleague,    Thank you for referring your patient, Ernesto Spivey, to the [facility]. Please see a copy of my visit note below.    [facility]  Neurosurgery followup:    HPI: Ernesto Spivey is a 44 year old male with a h/o left radicular pain.  He underwent left L5-S1 microdiscectomy with Dr. Rodriguez on 2/21/19.  He is here for his 6 week post op appointment.  He states the sharp shooting pain in his left leg is gone.  He has \"a numbness\" to the left lateral calf.  He states the first couple of days following surgery he had issues with standing on his left toes, but this has improved.  He denies falls since surgery.  He denies changes to bowel or bladder.  He is no longer taking pain medications.        Exam:  Constitutional:  Alert, well nourished, NAD.  HEENT: Normocephalic, atraumatic.   Pulm:  Without shortness of breath   CV:  No pitting edema of BLE.      Neurological:  Awake  Alert  Oriented x 3  Motor exam:        IP Q DF PF EHL  R   5  5   5   5    5  L   5  5   5   5    5     Able to spontaneously move L/E bilaterally  Sensation intact throughout all L/E dermatomes     Incision: Clean, dry and intact    A/P: Ernesto Spivey is a 44 year old male with a h/o left radicular pain.  He underwent left L5-S1 microdiscectomy with Dr. Rodriguez on 2/21/19.  He is here for his 6 week post op appointment.  He states the sharp shooting pain in his left leg is gone.  He has \"a numbness\" to the left lateral calf.  He states the first couple of days following surgery he had issues with standing on his left toes, but this has improved.  He denies falls since surgery.  He denies changes to bowel or bladder.  He is no longer taking pain medications.  We reviewed normal expectations following surgery as well as restrictions and easing into normal at 12 weeks post op.  The patient will " return as needed or contact us if other questions or concerns.    Patient Instructions   - May increase lifting restriction to 20-25 pounds.  May ease into normal around 12 weeks post surgery.  -Follow up as needed  - Call the clinic at [phone] for increased pain or any other questions and concerns.          Nurys Givens CNP  [facility]  [address] [address]    Tel [phone]  Pager [phone]      Again, thank you for allowing me to participate in the care of your patient.        Sincerely,        Nurys Givens, NP

## 2019-04-04 NOTE — PROGRESS NOTES
"[facility]  Neurosurgery followup:    HPI: Ernesto Spivey is a 44 year old male with a h/o left radicular pain.  He underwent left L5-S1 microdiscectomy with Dr. Rodriguez on 2/21/19.  He is here for his 6 week post op appointment.  He states the sharp shooting pain in his left leg is gone.  He has \"a numbness\" to the left lateral calf.  He states the first couple of days following surgery he had issues with standing on his left toes, but this has improved.  He denies falls since surgery.  He denies changes to bowel or bladder.  He is no longer taking pain medications.        Exam:  Constitutional:  Alert, well nourished, NAD.  HEENT: Normocephalic, atraumatic.   Pulm:  Without shortness of breath   CV:  No pitting edema of BLE.      Neurological:  Awake  Alert  Oriented x 3  Motor exam:        IP Q DF PF EHL  R   5  5   5   5    5  L   5  5   5   5    5     Able to spontaneously move L/E bilaterally  Sensation intact throughout all L/E dermatomes     Incision: Clean, dry and intact    A/P: Ernesto Spivey is a 44 year old male with a h/o left radicular pain.  He underwent left L5-S1 microdiscectomy with Dr. Rodriguez on 2/21/19.  He is here for his 6 week post op appointment.  He states the sharp shooting pain in his left leg is gone.  He has \"a numbness\" to the left lateral calf.  He states the first couple of days following surgery he had issues with standing on his left toes, but this has improved.  He denies falls since surgery.  He denies changes to bowel or bladder.  He is no longer taking pain medications.  We reviewed normal expectations following surgery as well as restrictions and easing into normal at 12 weeks post op.  The patient will return as needed or contact us if other questions or concerns.    Patient Instructions   - May increase lifting restriction to 20-25 pounds.  May ease into normal around 12 weeks post surgery.  -Follow up as needed  - Call the clinic at [phone] for " increased pain or any other questions and concerns.          Nurys Givens CNP  [facility]  [address] [address]    Tel [phone]  Pager [phone]

## 2019-04-04 NOTE — PATIENT INSTRUCTIONS
- May increase lifting restriction to 20-25 pounds.  May ease into normal around 12 weeks post surgery.  -Follow up as needed  - Call the clinic at [phone] for increased pain or any other questions and concerns.

## 2019-05-21 ENCOUNTER — HOSPITAL ENCOUNTER (OUTPATIENT)
Facility: CLINIC | Age: 44
Discharge: HOME | End: 2019-05-21
Attending: COLON & RECTAL SURGERY | Admitting: COLON & RECTAL SURGERY
Payer: COMMERCIAL

## 2019-05-21 VITALS
RESPIRATION RATE: 14 BRPM | HEART RATE: 66 BPM | HEIGHT: 71 IN | DIASTOLIC BLOOD PRESSURE: 93 MMHG | BODY MASS INDEX: 27.16 KG/M2 | OXYGEN SATURATION: 94 % | WEIGHT: 194 LBS | SYSTOLIC BLOOD PRESSURE: 122 MMHG

## 2019-05-21 PROBLEM — M54.42 ACUTE MIDLINE LOW BACK PAIN WITH LEFT-SIDED SCIATICA: Status: ACTIVE | Noted: 2018-12-31

## 2019-05-21 PROBLEM — M54.17 LUMBOSACRAL RADICULOPATHY AT L5: Status: ACTIVE | Noted: 2019-04-24

## 2019-05-21 PROBLEM — B35.1 ONYCHOMYCOSIS: Status: ACTIVE | Noted: 2019-03-01

## 2019-05-21 PROBLEM — G47.33 OBSTRUCTIVE APNEA: Status: ACTIVE | Noted: 2019-02-14

## 2019-05-21 PROBLEM — D12.6 TUBULAR ADENOMA OF COLON: Status: ACTIVE | Noted: 2019-05-07

## 2019-05-21 LAB — COLONOSCOPY: NORMAL

## 2019-05-21 PROCEDURE — 25000128 H RX IP 250 OP 636: Performed by: COLON & RECTAL SURGERY

## 2019-05-21 PROCEDURE — 45378 DIAGNOSTIC COLONOSCOPY: CPT | Performed by: COLON & RECTAL SURGERY

## 2019-05-21 PROCEDURE — G0105 COLORECTAL SCRN; HI RISK IND: HCPCS | Performed by: COLON & RECTAL SURGERY

## 2019-05-21 PROCEDURE — G0500 MOD SEDAT ENDO SERVICE >5YRS: HCPCS | Performed by: COLON & RECTAL SURGERY

## 2019-05-21 RX ORDER — FENTANYL CITRATE 50 UG/ML
INJECTION, SOLUTION INTRAMUSCULAR; INTRAVENOUS PRN
Status: DISCONTINUED | OUTPATIENT
Start: 2019-05-21 | End: 2019-05-21 | Stop reason: HOSPADM

## 2019-05-21 RX ORDER — ONDANSETRON 2 MG/ML
4 INJECTION INTRAMUSCULAR; INTRAVENOUS
Status: DISCONTINUED | OUTPATIENT
Start: 2019-05-21 | End: 2019-05-21 | Stop reason: HOSPADM

## 2019-05-21 RX ORDER — LIDOCAINE 40 MG/G
CREAM TOPICAL
Status: DISCONTINUED | OUTPATIENT
Start: 2019-05-21 | End: 2019-05-21 | Stop reason: HOSPADM

## 2019-05-21 ASSESSMENT — MIFFLIN-ST. JEOR: SCORE: 1792.11

## 2019-05-21 NOTE — H&P
Pre-Endoscopy History and Physical     Ernesto Spivey MRN# [id number]   YOB: 1975 Age: 44 year old     Date of Procedure: 5/21/2019  Primary care provider: Deneen Smith  Type of Endoscopy: colonoscopy  Reason for Procedure: screening  Type of Anesthesia Anticipated: moderate sedation    HPI:    Ernesto is a 44 year old male who will be undergoing the above procedure.  Patient had a colonoscopy in 2011 for intestinal symptoms and was found to have adenomatous polyps, On a follow up colonoscopy in 2016  he reportedly had more polyps. He denies a change in his bowel habits; he does occasionally note a small amount of blood on the toilet paper when he wipes.     A history and physical has been performed. The patient's medications and allergies have been reviewed. The risks and benefits of the procedure and the sedation options and risks were discussed with the patient.  All questions were answered and informed consent was obtained.      He denies a personal or family history of anesthesia complications or bleeding disorders.   Prior to Admission medications    Medication Sig Start Date End Date Taking? Authorizing Provider   atorvastatin (LIPITOR) 40 MG tablet Take 40 mg by mouth daily   Yes Reported, Patient   Cholecalciferol (VITAMIN D3 PO) Take 5,000 Units by mouth daily    Yes Reported, Patient   glatiramer (COPAXONE) 20 MG/ML injection Inject 40 mg Subcutaneous three times a week    Yes Reported, Patient   HYDROCHLOROTHIAZIDE 25 mg daily    Yes Reported, Patient   losartan (COZAAR) 25 MG tablet Take 50 mg by mouth daily    Yes Reported, Patient   acetaminophen (TYLENOL) 325 MG tablet Take 325-650 mg by mouth every 4 hours as needed for mild pain    Reported, Patient       Allergies   Allergen Reactions     Lisinopril Other (See Comments)     cough     Compazine [Prochlorperazine] Anxiety        No current facility-administered medications for this encounter.        Patient Active Problem  "List   Diagnosis     Tobacco use disorder     Tinea versicolor     Sebaceous cyst     Personal history of colonic polyps     Onychomycosis     Obstructive apnea     Multiple sclerosis (H)     Lumbosacral radiculopathy at L5     Kidney stone     Hyperlipidemia     Essential hypertension     Eczema     Adjustment disorder with mixed anxiety and depressed mood     Tubular adenoma of colon     Acute midline low back pain with left-sided sciatica        Past Medical History:   Diagnosis Date     Colon polyps      High cholesterol      Hypertension      MS (multiple sclerosis) (H)      Multiple sclerosis (H)      Sleep apnea     mild sleep apnea while lying on back per sleep study, no CPAP        Past Surgical History:   Procedure Laterality Date     COLONOSCOPY       DISCECTOMY LUMBAR POSTERIOR MICROSCOPIC ONE LEVEL Left 2/21/2019    Procedure: Left L5-S1 hemilaminectomy, medial facetectomy, foraminotomy with microdiscectomy;  Surgeon: Marlon Rodriguez MD;  Location: [facility] OR     ENT SURGERY      growth in inner ear removed as a child     ORTHOPEDIC SURGERY Right 2013    knee cyst removed       Social History     Tobacco Use     Smoking status: Former Smoker     Last attempt to quit: 2016     Years since quitting: 3.3     Smokeless tobacco: Never Used   Substance Use Topics     Alcohol use: Yes     Comment: 5-10/week       Family History   Problem Relation Age of Onset     Skin Cancer Mother      Multiple Sclerosis Mother      Hypertension Father      Colon Polyps Father      Hypertension Brother        REVIEW OF SYSTEMS:     5 point ROS negative except as noted above in HPI, including Gen., Resp., CV, GI &  system review.      PHYSICAL EXAM:   BP (!) 129/95   Resp 15   Ht 1.803 m (5' 11\")   Wt 88 kg (194 lb)   SpO2 95%   BMI 27.06 kg/m   Estimated body mass index is 27.06 kg/m  as calculated from the following:    Height as of this encounter: 1.803 m (5' 11\").    Weight as of this encounter: 88 kg (194 " lb).   GENERAL APPEARANCE: healthy  MENTAL STATUS: alert  AIRWAY EXAM: Mallampatti Class II (visualization of the soft palate, fauces, and uvula)  RESP: lungs clear to auscultation - no rales, rhonchi or wheezes  CV: regular rates and rhythm      DIAGNOSTICS:    Not indicated      IMPRESSION   ASA Class 2 - Mild systemic disease        PLAN:       Colonoscopy with possible polypectomy, possible biopsy. The indications, procedure and risks were explained to the patient who agrees to proceed.       The above has been forwarded to the consulting provider.      Signed Electronically by: Justina Rebolledo  May 21, 2019

## 2019-05-21 NOTE — BRIEF OP NOTE
[facility]    Brief Operative Note    Pre-operative diagnosis: SCREENING  Post-operative diagnosis normal colon  Procedure: Procedure(s):  COLONOSCOPY  Surgeon: Surgeon(s) and Role:     * Justina Rebolledo MD - Primary  Anesthesia: Conscious Sedation   Estimated blood loss: None  Drains: None  Specimens: * No specimens in log *  Findings:   See Provation procedure note in Epic    Complications: None.  Implants:  * No implants in log *

## 2019-05-22 ENCOUNTER — THERAPY VISIT (OUTPATIENT)
Dept: PHYSICAL THERAPY | Facility: CLINIC | Age: 44
End: 2019-05-22
Payer: COMMERCIAL

## 2019-05-22 DIAGNOSIS — Z98.890 S/P LUMBAR MICRODISCECTOMY: Primary | ICD-10-CM

## 2019-05-22 PROCEDURE — 97112 NEUROMUSCULAR REEDUCATION: CPT | Mod: GP | Performed by: PHYSICAL THERAPIST

## 2019-05-22 PROCEDURE — 97161 PT EVAL LOW COMPLEX 20 MIN: CPT | Mod: GP | Performed by: PHYSICAL THERAPIST

## 2019-05-22 NOTE — PROGRESS NOTES
[facility] Initial Evaluation  Subjective:  The history is provided by the patient. No  was used.   Ernesto Spivey is a 44 year old male with a lumbar (January 2019 ( DOI) February 21, 2019 ( DOS) ) condition.  Condition occurred with:  Insidious onset.  Condition occurred: for unknown reasons.  This is a new condition     Patient reports pain:  Central lumbar spine.  Radiates to: left SIJ. left lateral thigh and left lateral foot.  Pain is described as aching and is intermittent and reported as 2/10.  Associated with: stiffness, numbness. Worse during: am.  Symptoms are exacerbated by lifting Relieved by: stretching, walking.  Since onset symptoms are gradually improving.  Special testing: none.  Previous treatment: none.  Improvement with previous treatment: na.  General health as reported by patient is good.                                              Objective:  System         Lumbar/SI Evaluation  ROM:    AROM Lumbar:   Flexion:          Ext:                    80 with left low back pain    Side Bend:        Left:  40%    Right:  50%   Rotation:           Left:     Right:   Side Glide:        Left:     Right:           Lumbar Myotomes:    T12-L3 (Hip Flex):  Left: 5    Right: 5  L2-4 (Quads):  Left:  5    Right:  5  L4 (Ankle DF):  Left:  5    Right:  5  L5 (Great Toe Ext): Left: 5-    Right: 5-   S1 (Toe Raise):  Left: 4+    Right: 5  Lumbar DTR's:    L4 (Quad):  Left:  3   Right:  3  S1 (Achilles):  Left:  3   Right:  3  Cord Signs:  not assessed    Lumbar Dermtomes:      L1 Left:  Normal-light touch     L1 Right:  Normal-light touch  L2 Left:  Normal-light touch     L2 Right:  Normal-light touch  L3 Left:  Normal-light touch     L3 Right:  Normal-light touch  L4 Left:  Normal-light touch       L4 Right:  Normal-light touch  L5 Left:  Normal-light touch     L5 Right:  Normal-light touch                                                       fair abdominal tone,  faulty abdominal recruitment pattern with compensatory sucking in of upper and lower abdominals. Fair gluteus emmanuel contraction left and good right     General     ROS  Medical hstory of MS 8 years  and smoking 20  Years   Assessment/Plan:    Patient is a 44 year old male with lumbar complaints.    Patient has the following significant findings with corresponding treatment plan.                Diagnosis 1:  S/P lumbar microdiscectomy   Pain -  hot/cold therapy, manual therapy, self management, education, directional preference exercise and home program  Decreased ROM/flexibility - manual therapy, therapeutic exercise, therapeutic activity and home program  Decreased strength - therapeutic exercise, therapeutic activities and home program  Impaired muscle performance - neuro re-education and home program  Decreased function - therapeutic activities and home program    Therapy Evaluation Codes:       1) Decision-Making    Low complexity using standardized patient assessment instrument and/or measureable assessment of functional outcome.  Cumulative Therapy Evaluation is: Low complexity.    Previous and current functional limitations:  (See Goal Flow Sheet for this information)    Short term and Long term goals: (See Goal Flow Sheet for this information)     Communication ability:  Patient appears to be able to clearly communicate and understand verbal and written communication and follow directions correctly.  Treatment Explanation - The following has been discussed with the patient:   RX ordered/plan of care  Anticipated outcomes  Possible risks and side effects  This patient would benefit from PT intervention to resume normal activities.   Rehab potential is good.    Frequency:  1 X week, once daily  Duration:  for 6 weeks  Discharge Plan:  Achieve all LTG.  Independent in home treatment program.  Reach maximal therapeutic benefit.    Please refer to the daily flowsheet for treatment today, total treatment time  and time spent performing 1:1 timed codes.

## 2019-09-28 ENCOUNTER — HEALTH MAINTENANCE LETTER (OUTPATIENT)
Age: 44
End: 2019-09-28

## 2020-03-19 NOTE — PROGRESS NOTES
Refer to  initial evaluation as discharge summary as patient did not return to therapy to complete course of it.

## 2021-01-10 ENCOUNTER — HEALTH MAINTENANCE LETTER (OUTPATIENT)
Age: 46
End: 2021-01-10

## 2021-10-23 ENCOUNTER — HEALTH MAINTENANCE LETTER (OUTPATIENT)
Age: 46
End: 2021-10-23

## 2021-12-09 ENCOUNTER — LAB REQUISITION (OUTPATIENT)
Dept: LAB | Facility: CLINIC | Age: 46
End: 2021-12-09
Payer: COMMERCIAL

## 2021-12-09 DIAGNOSIS — L08.9 LOCAL INFECTION OF THE SKIN AND SUBCUTANEOUS TISSUE, UNSPECIFIED: ICD-10-CM

## 2021-12-09 PROCEDURE — 87070 CULTURE OTHR SPECIMN AEROBIC: CPT | Mod: ORL | Performed by: SPECIALIST

## 2021-12-11 LAB — BACTERIA SPEC CULT: NORMAL

## 2022-02-12 ENCOUNTER — HEALTH MAINTENANCE LETTER (OUTPATIENT)
Age: 47
End: 2022-02-12

## 2022-10-09 ENCOUNTER — HEALTH MAINTENANCE LETTER (OUTPATIENT)
Age: 47
End: 2022-10-09

## 2023-02-18 ENCOUNTER — HEALTH MAINTENANCE LETTER (OUTPATIENT)
Age: 48
End: 2023-02-18

## 2024-03-16 ENCOUNTER — HEALTH MAINTENANCE LETTER (OUTPATIENT)
Age: 49
End: 2024-03-16

## (undated) DEVICE — ESU CLEANER TIP 31142717

## (undated) DEVICE — GLOVE PROTEXIS W/NEU-THERA 8.5  2D73TE85

## (undated) DEVICE — SU WND CLOSURE VLOC 180 ABS 3-0 12" P-14 VLOCL0114

## (undated) DEVICE — MIDAS REX DISSECTING TOOL  14MH30

## (undated) DEVICE — PEN MARKING SKIN W/LABELS 31145884

## (undated) DEVICE — SU DERMABOND ADVANCED .7ML DNX12

## (undated) DEVICE — SPONGE COTTONOID 1/2X1/2" 80-1400

## (undated) DEVICE — SUCTION FRAZIER 12FR W/OBTURATOR 33120

## (undated) DEVICE — TUBING SUCTION MEDI-VAC SOFT 3/16"X20' N520A

## (undated) DEVICE — GLOVE PROTEXIS MICRO 7.5  2D73PM75

## (undated) DEVICE — GOWN XXL 9575

## (undated) DEVICE — DRSG TELFA ISLAND 4X10"

## (undated) DEVICE — SPONGE SURGIFOAM 100 1974

## (undated) DEVICE — RX SURGIFLO HEMOSTATIC MATRIX 8ML 2991

## (undated) DEVICE — LINEN POUCH DBL 5427

## (undated) DEVICE — CUSHION INSERT LG PRONE VIEW JACKSON TABLE

## (undated) DEVICE — GLOVE PROTEXIS BLUE W/NEU-THERA 8.5  2D73EB85

## (undated) DEVICE — SU VICRYL 2-0 CT-2 CR 8X18" J726D

## (undated) DEVICE — SYR 10ML LL W/O NDL 302995

## (undated) DEVICE — ESU GROUND PAD ADULT W/CORD E7507

## (undated) DEVICE — DECANTER BAG 2002S

## (undated) DEVICE — DECANTER VIAL 2006S

## (undated) DEVICE — DRAPE MICROSCOPE OPMI ZEISS 48X118" 306071-0000-000

## (undated) DEVICE — DRSG KERLIX FLUFFS X5

## (undated) DEVICE — SU VICRYL 0 CT-2 CR 8X18" J727D

## (undated) DEVICE — PACK SMALL SPINE RIDGES

## (undated) DEVICE — LINEN ORTHO ACL PACK 5447

## (undated) RX ORDER — NEOSTIGMINE METHYLSULFATE 1 MG/ML
VIAL (ML) INJECTION
Status: DISPENSED
Start: 2019-02-21

## (undated) RX ORDER — HYDRALAZINE HYDROCHLORIDE 20 MG/ML
INJECTION INTRAMUSCULAR; INTRAVENOUS
Status: DISPENSED
Start: 2019-02-21

## (undated) RX ORDER — LIDOCAINE HYDROCHLORIDE 10 MG/ML
INJECTION, SOLUTION EPIDURAL; INFILTRATION; INTRACAUDAL; PERINEURAL
Status: DISPENSED
Start: 2019-02-21

## (undated) RX ORDER — ONDANSETRON 2 MG/ML
INJECTION INTRAMUSCULAR; INTRAVENOUS
Status: DISPENSED
Start: 2019-02-21

## (undated) RX ORDER — PHENYLEPHRINE HCL IN 0.9% NACL 1 MG/10 ML
SYRINGE (ML) INTRAVENOUS
Status: DISPENSED
Start: 2019-02-21

## (undated) RX ORDER — FENTANYL CITRATE 50 UG/ML
INJECTION, SOLUTION INTRAMUSCULAR; INTRAVENOUS
Status: DISPENSED
Start: 2019-02-21

## (undated) RX ORDER — BUPIVACAINE HYDROCHLORIDE AND EPINEPHRINE 5; 5 MG/ML; UG/ML
INJECTION, SOLUTION EPIDURAL; INTRACAUDAL; PERINEURAL
Status: DISPENSED
Start: 2019-02-21

## (undated) RX ORDER — OXYCODONE AND ACETAMINOPHEN 5; 325 MG/1; MG/1
TABLET ORAL
Status: DISPENSED
Start: 2019-02-21

## (undated) RX ORDER — PROPOFOL 10 MG/ML
INJECTION, EMULSION INTRAVENOUS
Status: DISPENSED
Start: 2019-02-21

## (undated) RX ORDER — FENTANYL CITRATE 50 UG/ML
INJECTION, SOLUTION INTRAMUSCULAR; INTRAVENOUS
Status: DISPENSED
Start: 2019-05-21

## (undated) RX ORDER — DEXAMETHASONE SODIUM PHOSPHATE 4 MG/ML
INJECTION, SOLUTION INTRA-ARTICULAR; INTRALESIONAL; INTRAMUSCULAR; INTRAVENOUS; SOFT TISSUE
Status: DISPENSED
Start: 2019-02-21

## (undated) RX ORDER — GLYCOPYRROLATE 0.2 MG/ML
INJECTION INTRAMUSCULAR; INTRAVENOUS
Status: DISPENSED
Start: 2019-02-21